# Patient Record
Sex: FEMALE | Race: WHITE | NOT HISPANIC OR LATINO
[De-identification: names, ages, dates, MRNs, and addresses within clinical notes are randomized per-mention and may not be internally consistent; named-entity substitution may affect disease eponyms.]

---

## 2018-09-11 ENCOUNTER — APPOINTMENT (OUTPATIENT)
Dept: ORTHOPEDIC SURGERY | Facility: CLINIC | Age: 78
End: 2018-09-11
Payer: MEDICARE

## 2018-09-11 VITALS
WEIGHT: 147 LBS | HEART RATE: 76 BPM | SYSTOLIC BLOOD PRESSURE: 112 MMHG | HEIGHT: 65.5 IN | BODY MASS INDEX: 24.2 KG/M2 | DIASTOLIC BLOOD PRESSURE: 76 MMHG

## 2018-09-11 DIAGNOSIS — M79.671 PAIN IN RIGHT FOOT: ICD-10-CM

## 2018-09-11 DIAGNOSIS — I20.9 ANGINA PECTORIS, UNSPECIFIED: ICD-10-CM

## 2018-09-11 DIAGNOSIS — Z78.9 OTHER SPECIFIED HEALTH STATUS: ICD-10-CM

## 2018-09-11 DIAGNOSIS — E78.00 PURE HYPERCHOLESTEROLEMIA, UNSPECIFIED: ICD-10-CM

## 2018-09-11 DIAGNOSIS — M47.9 SPONDYLOSIS, UNSPECIFIED: ICD-10-CM

## 2018-09-11 DIAGNOSIS — S90.31XS CONTUSION OF RIGHT FOOT, SEQUELA: ICD-10-CM

## 2018-09-11 DIAGNOSIS — G57.61 LESION OF PLANTAR NERVE, RIGHT LOWER LIMB: ICD-10-CM

## 2018-09-11 DIAGNOSIS — Z87.891 PERSONAL HISTORY OF NICOTINE DEPENDENCE: ICD-10-CM

## 2018-09-11 DIAGNOSIS — G35 MULTIPLE SCLEROSIS: ICD-10-CM

## 2018-09-11 DIAGNOSIS — Z80.9 FAMILY HISTORY OF MALIGNANT NEOPLASM, UNSPECIFIED: ICD-10-CM

## 2018-09-11 DIAGNOSIS — M48.00 SPINAL STENOSIS, SITE UNSPECIFIED: ICD-10-CM

## 2018-09-11 PROCEDURE — 99203 OFFICE O/P NEW LOW 30 MIN: CPT

## 2018-09-11 PROCEDURE — 73630 X-RAY EXAM OF FOOT: CPT | Mod: RT

## 2018-09-11 RX ORDER — ISOSORBIDE MONONITRATE 30 MG/1
30 TABLET, EXTENDED RELEASE ORAL
Refills: 0 | Status: ACTIVE | COMMUNITY

## 2018-09-11 RX ORDER — ATORVASTATIN CALCIUM 40 MG/1
40 TABLET, FILM COATED ORAL
Refills: 0 | Status: ACTIVE | COMMUNITY

## 2018-09-11 RX ORDER — ASPIRIN 325 MG/1
TABLET, FILM COATED ORAL
Refills: 0 | Status: ACTIVE | COMMUNITY

## 2018-09-11 NOTE — HISTORY OF PRESENT ILLNESS
[de-identified] : Mrs.. Mccallum is a 78-year-old woman who comes in today for foot pain.\par 15 mos ago she dropped a hedge clipper on top of the right foot. There was a little cut. There is never any infection and everything seemed to heal fine.\par Since then she gets intermittent pain. Currently there is no significant pain at all.\par When she gets pain that is about 3-4 /10 but very bothersome. When she made the appointment the pain was constant for 3 days and worse with ambulating. It never swells. \par \par She has a h/o right sciatica. She has had lumbar injections. Right now there is no sciatic

## 2018-09-11 NOTE — ASSESSMENT
[FreeTextEntry1] : 78-year-old woman who dropped sharp heavy object on her right foot over a year ago and has had intermittent pain in in the mid forefoot since then. 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 Currently there is tenderness but no pain walking or other symptoms. If the pain recurs she can go for diagnostic ultrasound to confirm the diagnosis and they could do a corticosteroid injection under ultrasound guidance. Otherwise if she has pain she can try some warm soaks and ice. Her kidney function is slightly elevated so she wants to minimize NSAIDs. She can take Tylenol for pain. She should experiment as to which shoes feel best when she has the pain or what causes the pain.\par If it is becoming more persistently painful or problematic she may come in for followup

## 2018-09-11 NOTE — PHYSICAL EXAM
[LE] : Sensory: Intact in bilateral lower extremities [DP] : dorsalis pedis 2+ and symmetric bilaterally [PT] : posterior tibial 2+ and symmetric bilaterally [Normal RLE] : Right Lower Extremity: No scars, rashes, lesions, ulcers, skin intact [Normal LLE] : Left Lower Extremity: No scars, rashes, lesions, ulcers, skin intact [Normal Touch] : sensation intact for touch [Normal] : Oriented to person, place, and time, insight and judgement were intact and the affect was normal [de-identified] : Right  Foot and Ankle: \par Gait is not antalgic.\par The patient is able to walk on heels and toes \par No Edema, ecchymoses, erythema.\par Ankle range of motion is with 7 degrees dorsiflexion and 35 degrees plantar flexion.\par Subtalar motion intact.\par Hallux MP joint range of motion mild stiffness but no pain with ROM.\par Tender third webspace which reproduces the pain.\par Positive Bonilla's click.\par Hallux valgus Deformity. No lesser toe deformity\par Motor: 5/5 ATT, GS , EHL, PTT/inversion, peroneals/eversion.\par Normal neurovascular exam\par  [de-identified] : No respiratory distress [de-identified] : X-rays of the right foot weightbearing 3 views show no abnormalities at the site of pain around the third and fourth metatarsals. 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